# Patient Record
Sex: FEMALE | Race: WHITE
[De-identification: names, ages, dates, MRNs, and addresses within clinical notes are randomized per-mention and may not be internally consistent; named-entity substitution may affect disease eponyms.]

---

## 2017-03-27 ENCOUNTER — HOSPITAL ENCOUNTER (OUTPATIENT)
Dept: HOSPITAL 47 - MMGSC | Age: 57
End: 2017-03-27
Payer: COMMERCIAL

## 2017-03-27 DIAGNOSIS — R79.89: Primary | ICD-10-CM

## 2017-03-27 LAB
ALP SERPL-CCNC: 88 U/L (ref 38–126)
ALT SERPL-CCNC: 58 U/L (ref 9–52)
AST SERPL-CCNC: 39 U/L (ref 14–36)
BILIRUBIN DIRECT+TOT PNL SERPL-MCNC: 0.3 MG/DL (ref 0–0.2)
HEPATITIS B CORE IGM INDEX: 0.04
HEPATITIS B SURFACE AG INDEX: 0.07
HEPATITIS C VIRUS IGG  INDEX: 0.01
PROT SERPL-MCNC: 6.3 G/DL (ref 6.3–8.2)

## 2017-03-27 PROCEDURE — 80074 ACUTE HEPATITIS PANEL: CPT

## 2017-03-27 PROCEDURE — 36415 COLL VENOUS BLD VENIPUNCTURE: CPT

## 2017-03-27 PROCEDURE — 80076 HEPATIC FUNCTION PANEL: CPT

## 2017-08-12 ENCOUNTER — HOSPITAL ENCOUNTER (OUTPATIENT)
Dept: HOSPITAL 47 - LABWHC1 | Age: 57
Discharge: HOME | End: 2017-08-12
Payer: COMMERCIAL

## 2017-08-12 DIAGNOSIS — E78.00: Primary | ICD-10-CM

## 2017-08-12 LAB
CHOLEST SERPL-MCNC: 187 MG/DL (ref ?–200)
HDLC SERPL-MCNC: 61 MG/DL (ref 40–60)

## 2017-08-12 PROCEDURE — 80061 LIPID PANEL: CPT

## 2017-08-12 PROCEDURE — 36415 COLL VENOUS BLD VENIPUNCTURE: CPT

## 2017-08-14 ENCOUNTER — HOSPITAL ENCOUNTER (OUTPATIENT)
Dept: HOSPITAL 47 - RADMAMWWP | Age: 57
Discharge: HOME | End: 2017-08-14
Payer: COMMERCIAL

## 2017-08-14 ENCOUNTER — HOSPITAL ENCOUNTER (OUTPATIENT)
Dept: HOSPITAL 47 - ORWHC2ENDO | Age: 57
Discharge: HOME | End: 2017-08-14
Attending: SURGERY
Payer: COMMERCIAL

## 2017-08-14 VITALS — TEMPERATURE: 97.4 F

## 2017-08-14 VITALS — BODY MASS INDEX: 43.2 KG/M2

## 2017-08-14 VITALS — RESPIRATION RATE: 16 BRPM | DIASTOLIC BLOOD PRESSURE: 71 MMHG | SYSTOLIC BLOOD PRESSURE: 128 MMHG | HEART RATE: 69 BPM

## 2017-08-14 DIAGNOSIS — I10: ICD-10-CM

## 2017-08-14 DIAGNOSIS — Z86.73: ICD-10-CM

## 2017-08-14 DIAGNOSIS — Z88.5: ICD-10-CM

## 2017-08-14 DIAGNOSIS — E78.5: ICD-10-CM

## 2017-08-14 DIAGNOSIS — Z87.891: ICD-10-CM

## 2017-08-14 DIAGNOSIS — E11.9: ICD-10-CM

## 2017-08-14 DIAGNOSIS — Z79.899: ICD-10-CM

## 2017-08-14 DIAGNOSIS — K21.9: ICD-10-CM

## 2017-08-14 DIAGNOSIS — K57.30: ICD-10-CM

## 2017-08-14 DIAGNOSIS — Z12.11: Primary | ICD-10-CM

## 2017-08-14 DIAGNOSIS — Z12.31: Primary | ICD-10-CM

## 2017-08-14 DIAGNOSIS — Z86.010: ICD-10-CM

## 2017-08-14 LAB — GLUCOSE BLD-MCNC: 93 MG/DL (ref 75–99)

## 2017-08-14 PROCEDURE — 77063 BREAST TOMOSYNTHESIS BI: CPT

## 2017-08-14 PROCEDURE — 45378 DIAGNOSTIC COLONOSCOPY: CPT

## 2017-08-14 NOTE — P.GSHP
History of Present Illness


H&P Date: 17


Chief Complaint: Screening colonoscopy, history of colon polyps





Is a 56-year-old female who presents today for colonoscopy.  Her last 

colonoscopy was over 5 years ago.  She had benign colon polyps that time.





Past Medical History


Past Medical History: Cancer, Chest Pain / Angina, CVA/TIA, Diabetes Mellitus, 

GERD/Reflux, Hyperlipidemia, Hypertension, Osteoarthritis (OA)


Additional Past Medical History / Comment(s): migraines, TIA, hiatal hernia, 

diet control diabetic, skin cancer


History of Any Multi-Drug Resistant Organisms: None Reported


Past Surgical History: Cholecystectomy, Hysterectomy, Orthopedic Surgery, 

Tonsillectomy


Additional Past Surgical History / Comment(s): rt shoulder rotator cuff, left 

knee surgery x 7-some arthroscopic, 1/15/16 lap nissen fundoplasty


Past Anesthesia/Blood Transfusion Reactions: Family History of Problems w/ 

Anesthesia, Motion Sickness


Additional Past Anesthesia/Blood Transfusion Reaction / Comment(s): mom and 

sister PONV


Smoking Status: Former smoker





- Past Family History


  ** Mother


Family Medical History: No Reported History, Deep Vein Thrombosis (DVT), 

Hypertension





  ** Father


Additional Family Medical History / Comment(s): born with only one kidney.  he 

 in a mva when pt was very young





Medications and Allergies


 Home Medications











 Medication  Instructions  Recorded  Confirmed  Type


 


Citalopram Hydrobromide [CeleXA] 20 mg PO QAM 12/17/15 08/14/17 History


 


Ibuprofen [Motrin] 200 - 400 mg PO Q4-6H PRN 12/17/15 08/14/17 History


 


Lisinopril-Hctz 20-12.5 mg 2 tab PO QAM 12/17/15 08/14/17 History





[Zestoretic 20-12.5]    


 


Ranitidine HCl [Zantac] 150 mg PO BID 12/17/15 08/14/17 History


 


Simvastatin [Zocor] 40 mg PO QAM 12/17/15 08/14/17 History











 Allergies











Allergy/AdvReac Type Severity Reaction Status Date / Time


 


codeine Allergy  Nausea & Verified 08/10/17 14:26





   Vomiting  


 


hydrocodone bitartrate Allergy  Nausea & Verified 08/10/17 14:26





[From Vicodin]   Vomiting  


 


hydromorphone HCl Allergy  Nausea & Verified 08/10/17 14:26





[From Dilaudid]   Vomiting  


 


meperidine HCl [From Demerol] Allergy  Nausea & Verified 08/10/17 14:26





   Vomiting  


 


morphine Allergy  Nausea & Verified 08/10/17 14:26





   Vomiting  














Surgical - Exam


 Vital Signs











Temp Pulse Resp BP Pulse Ox


 


 97.4 F L  69   14   136/78   97 


 


 17 07:11  17 07:11  17 07:11  17 07:11  17 07:11














- General


well developed, no distress





- Eyes


PERRL





- ENT


normal pinna





- Neck


no masses





- Respiratory


normal expansion





- Cardiovascular


Rhythm: regular





- Abdomen


Abdomen: soft, non tender





Assessment and Plan


Plan: 





History: Positive.  We'll perform colonoscopy.

## 2017-08-14 NOTE — P.OP
Date of Procedure: 08/14/17


Preoperative Diagnosis: 


History of colon polyps


Postoperative Diagnosis: 


Diverticulosis


Procedure(s) Performed: 


Colonoscopy


Implants: 





Anesthesia: MAC


Surgeon: Marquise Suarez


Pathology: none sent


Condition: stable


Disposition: PACU


Indications for Procedure: 





Operative Findings: 





Description of Procedure: 


Patient's placed on the endoscopy table in the lateral position.  She received 

IV sedation.  Digital rectal exam was performed which revealed no 

abnormalities.  The flexible colonoscope was then placed patient anus passed 

throughout the entire colon.  The ileocecal valve was visualized.  The cecum, 

ascending and transverse colon appeared normal.  In the descending and sigmoid 

colon there was evidence of diverticular changes without diverticulitis.  The 

scope was then brought back the rectum and this appeared normal.  Scope was 

withdrawn for patient.

## 2017-08-24 NOTE — MM
Reason for exam: screening  (asymptomatic).

Last mammogram was performed 4 years and 8 months ago.



History:

Patient is postmenopausal and has history of other cancer at age 54.

Took estrogen for 2 years.



Physical Findings:

A clinical breast exam by your physician is recommended on an annual basis and 

results should be correlated with mammographic findings.



MG 3D Screening Mammo W/Cad

Bilateral CC and MLO view(s) were taken.

Prior study comparison: December 28, 2012, mammogram, performed at [facility].  October 11, 2011, mammogram, performed at [facility].

There are scattered fibroglandular densities.  No significant changes when 

compared with prior studies.





ASSESSMENT: Negative, BI-RAD 1



RECOMMENDATION:

Routine screening mammogram of both breasts in 1 year.

## 2017-10-02 ENCOUNTER — HOSPITAL ENCOUNTER (OUTPATIENT)
Dept: HOSPITAL 47 - EC | Age: 57
Setting detail: OBSERVATION
LOS: 2 days | Discharge: HOME | End: 2017-10-04
Attending: INTERNAL MEDICINE | Admitting: INTERNAL MEDICINE
Payer: COMMERCIAL

## 2017-10-02 DIAGNOSIS — H53.8: ICD-10-CM

## 2017-10-02 DIAGNOSIS — R00.1: ICD-10-CM

## 2017-10-02 DIAGNOSIS — M19.90: ICD-10-CM

## 2017-10-02 DIAGNOSIS — E11.9: ICD-10-CM

## 2017-10-02 DIAGNOSIS — F41.9: ICD-10-CM

## 2017-10-02 DIAGNOSIS — Z86.73: ICD-10-CM

## 2017-10-02 DIAGNOSIS — E78.5: ICD-10-CM

## 2017-10-02 DIAGNOSIS — I10: Primary | ICD-10-CM

## 2017-10-02 DIAGNOSIS — Z87.891: ICD-10-CM

## 2017-10-02 DIAGNOSIS — Z79.899: ICD-10-CM

## 2017-10-02 DIAGNOSIS — K44.9: ICD-10-CM

## 2017-10-02 DIAGNOSIS — Z88.5: ICD-10-CM

## 2017-10-02 DIAGNOSIS — Z82.49: ICD-10-CM

## 2017-10-02 DIAGNOSIS — K21.9: ICD-10-CM

## 2017-10-02 DIAGNOSIS — Z85.828: ICD-10-CM

## 2017-10-02 DIAGNOSIS — G43.909: ICD-10-CM

## 2017-10-02 LAB
ALP SERPL-CCNC: 72 U/L (ref 38–126)
ALT SERPL-CCNC: 31 U/L (ref 9–52)
ANION GAP SERPL CALC-SCNC: 9 MMOL/L
AST SERPL-CCNC: 21 U/L (ref 14–36)
BASOPHILS # BLD AUTO: 0.1 K/UL (ref 0–0.2)
BASOPHILS NFR BLD AUTO: 1 %
BUN SERPL-SCNC: 18 MG/DL (ref 7–17)
CALCIUM SPEC-MCNC: 9.2 MG/DL (ref 8.4–10.2)
CH: 29.9
CHCM: 32.2
CHLORIDE SERPL-SCNC: 106 MMOL/L (ref 98–107)
CK SERPL-CCNC: 26 U/L (ref 30–135)
CO2 SERPL-SCNC: 24 MMOL/L (ref 22–30)
EOSINOPHIL # BLD AUTO: 0.1 K/UL (ref 0–0.7)
EOSINOPHIL NFR BLD AUTO: 2 %
ERYTHROCYTE [DISTWIDTH] IN BLOOD BY AUTOMATED COUNT: 4.15 M/UL (ref 3.8–5.4)
ERYTHROCYTE [DISTWIDTH] IN BLOOD: 12.6 % (ref 11.5–15.5)
GLUCOSE SERPL-MCNC: 90 MG/DL (ref 74–99)
HCT VFR BLD AUTO: 38.8 % (ref 34–46)
HDW: 2.38
HGB BLD-MCNC: 12.9 GM/DL (ref 11.4–16)
LUC NFR BLD AUTO: 3 %
LYMPHOCYTES # SPEC AUTO: 3.3 K/UL (ref 1–4.8)
LYMPHOCYTES NFR SPEC AUTO: 44 %
MAGNESIUM SPEC-SCNC: 1.9 MG/DL (ref 1.6–2.3)
MCH RBC QN AUTO: 31 PG (ref 25–35)
MCHC RBC AUTO-ENTMCNC: 33.2 G/DL (ref 31–37)
MCV RBC AUTO: 93.5 FL (ref 80–100)
MONOCYTES # BLD AUTO: 0.4 K/UL (ref 0–1)
MONOCYTES NFR BLD AUTO: 5 %
NEUTROPHILS # BLD AUTO: 3.4 K/UL (ref 1.3–7.7)
NEUTROPHILS NFR BLD AUTO: 46 %
NON-AFRICAN AMERICAN GFR(MDRD): >60
POTASSIUM SERPL-SCNC: 4.2 MMOL/L (ref 3.5–5.1)
PROT SERPL-MCNC: 6.3 G/DL (ref 6.3–8.2)
SODIUM SERPL-SCNC: 139 MMOL/L (ref 137–145)
TROPONIN I SERPL-MCNC: <0.012 NG/ML (ref 0–0.03)
WBC # BLD AUTO: 0.2 10*3/UL
WBC # BLD AUTO: 7.4 K/UL (ref 3.8–10.6)
WBC (PEROX): 7.38

## 2017-10-02 PROCEDURE — 96374 THER/PROPH/DIAG INJ IV PUSH: CPT

## 2017-10-02 PROCEDURE — 71020: CPT

## 2017-10-02 PROCEDURE — 36415 COLL VENOUS BLD VENIPUNCTURE: CPT

## 2017-10-02 PROCEDURE — 96375 TX/PRO/DX INJ NEW DRUG ADDON: CPT

## 2017-10-02 PROCEDURE — 96361 HYDRATE IV INFUSION ADD-ON: CPT

## 2017-10-02 PROCEDURE — 82550 ASSAY OF CK (CPK): CPT

## 2017-10-02 PROCEDURE — 99285 EMERGENCY DEPT VISIT HI MDM: CPT

## 2017-10-02 PROCEDURE — 97161 PT EVAL LOW COMPLEX 20 MIN: CPT

## 2017-10-02 PROCEDURE — 84484 ASSAY OF TROPONIN QUANT: CPT

## 2017-10-02 PROCEDURE — 70553 MRI BRAIN STEM W/O & W/DYE: CPT

## 2017-10-02 PROCEDURE — 80053 COMPREHEN METABOLIC PANEL: CPT

## 2017-10-02 PROCEDURE — 85025 COMPLETE CBC W/AUTO DIFF WBC: CPT

## 2017-10-02 PROCEDURE — 93880 EXTRACRANIAL BILAT STUDY: CPT

## 2017-10-02 PROCEDURE — 83735 ASSAY OF MAGNESIUM: CPT

## 2017-10-02 PROCEDURE — 70450 CT HEAD/BRAIN W/O DYE: CPT

## 2017-10-02 PROCEDURE — 93005 ELECTROCARDIOGRAM TRACING: CPT

## 2017-10-02 PROCEDURE — 96372 THER/PROPH/DIAG INJ SC/IM: CPT

## 2017-10-02 PROCEDURE — 93306 TTE W/DOPPLER COMPLETE: CPT

## 2017-10-02 PROCEDURE — 96376 TX/PRO/DX INJ SAME DRUG ADON: CPT

## 2017-10-02 PROCEDURE — 82553 CREATINE MB FRACTION: CPT

## 2017-10-02 RX ADMIN — MECLIZINE PRN MG: 12.5 TABLET ORAL at 23:21

## 2017-10-02 RX ADMIN — IBUPROFEN PRN MG: 800 TABLET, FILM COATED ORAL at 23:21

## 2017-10-02 NOTE — P.HPIM
History of Present Illness


H&P Date: 10/02/17


Chief Complaint: Blurry vision acute onset


 This is a very pleasant 56-year-old female with a past medical history 

significant for hypertension who presents to our facility with complaints of 

acute onset of blurry vision.  Patient states that last evening she was in her 

usual state of health had no significant symptoms or issues when she went to 

bed.  When she woke up this morning she had an acute onset of blurry vision 

with dizziness.  She also felt mildly nauseated but despite this decided to get 

ready for work.  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  She started to experience a 

significant headache as well as nausea with dry heaves and ongoing blurry 

vision.  It got to a point where she was unable to move due to the significant 

visual disturbance.  She contacted her  who picked her up and brought 

her to the emergency room for further evaluation.  In the emergency room she 

had a computed tomography scan of the brain that was negative for any acute 

intracranial abnormalities.  The patient was given some IV fluid hydration and 

addition to some meclizine but despite this had ongoing symptoms.  


  On my assessment the patient states that she continues to have symptoms and 

has really not been able to get up from the bed due to concern of fall.  She 

has no history of stroke but states that she was told in the past that she had 

a mini stroke.  Does not recall having any neurologic symptoms in the past.  

Does have a history of migraine cephalgia however these were mostly when she 

was a young lady her last migraine was when she was in high school.  She does 

not take a baby aspirin.  She used to be on a statin however this medication 

was discontinued as she's had significant weight loss as an outpatient with 

normalization of her lipid panel per the patient.  In fact she states that her 

blood pressure medicines have also been decreased in dose due to her weight 

loss.  She denies any numbness tingling, no associated weakness in her 

extremities no slurring of speech no facial asymmetry.  She has had no recent 

fevers chills, rigors, cough.








Review of Systems


Constitutional:  Patient reports no fever, no chills, she has had intentional 

weight loss, with diet and exercise


Eyes: Patient reports acute blurry vision, no loss of vision, associated with a 

left-sided occipital headache


ENT:  Patient reports no rhinorrhea,  no post nasal drip, no sore throat


Cardiovascular: Patient reports no chest, no edema, no palpitations, no syncope

, no orthopnea, no paroxysmal nocturnal dyspnea.


Respiratory:  Patient reports no dyspnea, no cough, no wheeze 


Gastrointestinal:  Patient reports nausea with dry heaves, but no abdominal 

pain no diarrhea melena or hematochezia


Genitourinary:   Patient reports no dysuria, no urinary frequency, no hematuria.


Musculoskeletal:  Patient reports no unusual joint pain, no joint swelling or 

weakness.  Patient reports no muscular pain.


Psychiatric:  Patient reports no changes in mood, no sleeping problems.   

Patient reports no changes in memory.


Endocrine:  Patient reports no thirst, no polyuria, no cold intolerance, no 

heat intolerance.


Neurological:  Patient reports no unusual paresthesias, no seizures, no paresis

, no paralysis, no facila droop.  She does complain of blurry vision and a left 

occipital


Heme/Lymphatic:   Patient reports no easy bruising, no bleeding tendency, no 

lymphadenopathy.


Allergic/ Immunologic:  Patient reports no recent allergic reactions or 

immunologic history.


Skin:  Patient reports no rashes or unusual lesions.











Past Medical History


Past Medical History: Cancer, Chest Pain / Angina, CVA/TIA, Diabetes Mellitus, 

GERD/Reflux, Hyperlipidemia, Hypertension, Osteoarthritis (OA)


Additional Past Medical History / Comment(s): migraines, TIA, hiatal hernia, 

diet control diabetic, skin cancer, "I'M BEING WORKED UP FOR POSS RA", 16 

DOG BITE RT INDEX FINGER.


History of Any Multi-Drug Resistant Organisms: None Reported


Past Surgical History: Cholecystectomy, Heart Catheterization, Hysterectomy, 

Orthopedic Surgery, Tonsillectomy


Additional Past Surgical History / Comment(s): rt shoulder rotator cuff, left 

knee surgery x 7-some arthroscopic, 1/15/16 lap nissen fundoplasty LT WRIST 

GANGLION CYST, LT CARPAL TUNNEL


Past Anesthesia/Blood Transfusion Reactions: Family History of Problems w/ 

Anesthesia, Motion Sickness


Additional Past Anesthesia/Blood Transfusion Reaction / Comment(s): mom and 

sister PONV


Smoking Status: Former smoker





- Past Family History


  ** Mother


Family Medical History: Deep Vein Thrombosis (DVT), Hypertension, 

Osteoarthritis (OA)





  ** Father


Additional Family Medical History / Comment(s): dad  in mva in . only 

hx known is he was born with only one kidney.





Medications and Allergies


 Home Medications











 Medication  Instructions  Recorded  Confirmed  Type


 


Citalopram Hydrobromide [CeleXA] 20 mg PO QAM 12/17/15 10/02/17 History


 


Lisinopril-Hctz 20-12.5 mg 1 tab PO QAM 12/17/15 10/02/17 History





[Zestoretic 20-12.5]    


 


Ibuprofen [Motrin] 800 mg PO Q6H PRN 10/02/17 10/02/17 History











 Allergies











Allergy/AdvReac Type Severity Reaction Status Date / Time


 


codeine Allergy  Nausea & Verified 10/02/17 11:04





   Vomiting  


 


hydrocodone bitartrate Allergy  Nausea & Verified 10/02/17 11:04





[From Vicodin]   Vomiting  


 


hydromorphone HCl Allergy  Nausea & Verified 10/02/17 11:04





[From Dilaudid]   Vomiting  


 


meperidine HCl [From Demerol] Allergy  Nausea & Verified 10/02/17 11:04





   Vomiting  


 


morphine Allergy  Nausea & Verified 10/02/17 11:04





   Vomiting  














Physical Exam


Vitals: 


 Vital Signs











  Temp Pulse Pulse Resp BP BP Pulse Ox


 


 10/02/17 14:37    55 L    


 


 10/02/17 13:59  97.7 F   60  18   152/72  100


 


 10/02/17 13:29  98 F  58 L   16  141/66   100


 


 10/02/17 10:53  97.6 F  52 L   18  146/72   99








 Intake and Output











 10/02/17 10/02/17 10/02/17





 06:59 14:59 22:59


 


Other:   


 


  Voiding Method  Toilet 


 


  Weight  117.3 kg 








 Patient Weight











 10/03/17





 06:59


 


Weight 117.3 kg











Constitutional:       No acute distress, conversant, pleasant


Eyes:      Anicteric sclerae, moist conjunctiva, no lid-lag


         PERRLA


         ENMT: NC/AT Oropharynx clear, no erythema, exudates


Neck:      Supple, FROM, no masses, or JVD


         No carotid bruits


         No thyromegaly


Lungs:        Clear to auscultation


         Clear to percussion


         Normal respiratory effort, no accessory muscle use 


Cardiovascular:   Heart regular in rate and rhythm, 


         No murmurs, gallops, or rubs


         No peripheral edema


Abdominal:       Soft


         Nontender, no guarding, rebound or rigidity


         Abdomen moving with respiration


         Normoactive bowel sounds


         No hepatomegaly, No splenomegaly


         No palpable mass 


         No abdominal wall hernia noted 


Skin:       Normal temperature, tone, texture, turgor


         No induration





         No rash, lesions





Extremities:      No digital cyanosis 


         No clubbing


         Pedal pulses intact and symmetrical


         Radial pulses intact and symmetrical 


         Normal gait and station


         No calf tenderness 


Psychiatric:      Alert and oriented to person, place and time


         Appropriate affect


         Intact judgement         


Neuro:      Muscles Strength 5/5 in all 4 extremities


         Sensation to light touch grossly present throughout


         Cranial nerves II-XII grossly intact


         No focal sensory deficits








Results


CBC & Chem 7: 


 10/02/17 11:34





 10/02/17 11:34


Labs: 


 Abnormal Lab Results - Last 24 Hours (Table)











  10/02/17 10/02/17 Range/Units





  11:34 11:34 


 


BUN   18 H  (7-17)  mg/dL


 


Total Creatine Kinase  26 L   ()  U/L














Assessment and Plan


(1) TIA (transient ischemic attack)


Status: Acute   





(2) HTN (hypertension)


Status: Acute   





(3) Blurred vision, bilateral


Status: Acute   





(4) Bradycardia


Status: Acute   


Plan: 


1.  Acute onset of blurry vision associated with occipital headache and 

imbalance and patient with moderate risk for CNS abnormality:


* Patient at this time is being placed in observation status, will undergo a 

stroke workup including an MRI of the brain, echocardiogram, and ultrasound of 

the carotids


* Patient will be started on a baby aspirin, she is aspirin davie.  Fasting 

lipid panel will be ordered, and statin therapy will be resumed if there is any 

evidence of CVA


* Should her workup be negative, will continue symptomatic treatment, we'll 

consider Apley's maneuver as she may have BPPV 


2.  Essential hypertension:


* Monitor blood pressure, allow for permissive higher pressures in the setting 

of possible acute CNS event.  Home blood pressure medications will remain on 

hold and we'll monitor closely.


3.  Nausea with dry heaves


* As needed anti-emetics have been ordered


4.  Acute headache:


* As needed analgesia orally has been ordered


5.  DVT prophylaxis as per protocol


6.  Full code 


    Total time spent in the initial evaluation and coordination of care of this 

patient was greater than 65 minutes.

## 2017-10-02 NOTE — XR
EXAMINATION TYPE: XR chest 2V

 

DATE OF EXAM: 10/2/2017

 

COMPARISON: NONE

 

TECHNIQUE: PA and lateral views submitted.

 

HISTORY: Chest pain

 

FINDINGS:

The lungs are clear and  there is no pneumothorax, pleural effusion, or focal pneumonia.  Nodule left
 upper lobe may represent small granuloma. Hypertrophic and degenerative change of the spine. Mediast
inum is somewhat prominent.

 

IMPRESSION: 

1. Mediastinum is somewhat prominent consider follow-up CT scan chest..

## 2017-10-02 NOTE — CT
EXAMINATION TYPE: CT brain wo con

 

DATE OF EXAM: 10/2/2017

 

COMPARISON: NONE

 

HISTORY: Dizziness and blurred vision

 

CT DLP: 1109 mGycm

 

Unenhanced CT of the brain was performed.  

 

The ventricles, basal cisterns and sulci overlying the cerebral convexities demonstrate mild enlargem
ent. 

 

There is no evidence for intracranial hemorrhage or sulcal effacement.  

 

There is decreased attenuation about the periventricular white matter and deep white matter of both c
erebral hemispheres, compatible with chronic small vessel ischemia. Differential diagnosis does inclu
de demyelination. 

 

No mass effects are seen.No midline shift.

 

Osseous calvarium is intact.  

 

If symptoms persist consider MRI.  

 

IMPRESSION:

 

1. Age related atrophic and chronic small vessel ischemic change without acute intracranial process s
een at this time.

## 2017-10-02 NOTE — US
EXAMINATION TYPE: US carotid duplex BILAT

 

DATE OF EXAM: 10/2/2017

 

COMPARISON: NONE

 

CLINICAL HISTORY: CVA work up.

 

EXAM MEASUREMENTS: 

 

RIGHT:  Peak Systolic Velocity (PSV) cm/sec

----- Right CCA:  87.1  

----- Right ICA:  75.4     

----- Right ECA:  61.2   

ICA/CCA ratio:  0.9    

 

RIGHT:  End Diastole cm/sec

----- Right CCA:  12.1   

----- Right ICA:  23.0      

----- Right ECA:  8.7

 

LEFT:  Peak Systolic Velocity (PSV) cm/sec

----- Left CCA:  86.3  

----- Left ICA:  123.6   

----- Left ECA:  52.7  

ICA/CCA ratio:  1.4  

 

LEFT:  End Diastole cm/sec

----- Left CCA:  19.9  

----- Left ICA:  40.8   

----- Left ECA:  8.2

 

VERTEBRALS (direction of flow):

Right Vertebral: Antegrade

Left Vertebral: Antegrade

 

 

 

Patient of large body habitus with short thick neck making exam technically difficult. No significant
 velocity elevations.

 

Exam suboptimal per technologist as detailed above. Grayscale images of the carotid bulbs show no sig
nificant focal plaque bilaterally. Velocity measurements and ratios in visualized portion of both int
ernal carotid arteries is within normal limits. 

 

IMPRESSION:  Suboptimal study without hemodynamically significant stenosis clearly seen in either int
ernal carotid artery

## 2017-10-02 NOTE — MR
EXAMINATION TYPE: MR brain wo/w con

 

DATE OF EXAM: 10/2/2017

 

COMPARISON: NONE

 

HISTORY: Dizziness, Blurry Vision, CVA

 

TECHNIQUE: 

Multiplanar, multisequence images of the brain and brainstem is performed without and with IV contras
t, utilizing 12 mL intravenous Gadavist .

 

FINDINGS: Diffusion weighted images demonstrate no evidence of a recent infarct or other diffusion ab
normality. Changes of chronic sinusitis.

 

There are a few scattered focal areas within the white matter which are nonspecific noted bilaterally
. No lesions perpendicular to ventricular system. No callosal lesions.

 

Vague enhancement along the inferior frontal lobe on the right. However there severe artifact.

 

Midline structures demonstrate normal morphology.  The craniocervical junction appears within normal 
limits.  Post contrast images demonstrate no abnormal enhancement. The dural venous sinuses appear pa
tent.

 

IMPRESSION: 

1. Nonspecific scattered areas of abnormal signal the white matter can be seen with migraine headache
s, remote microvascular ischemia, hypertension, or demyelinating disease.

2. There is vague enhancement along the inferior frontal lobe on the right. This may be artifactual a
s the exam is limited by significant motion artifact. Could not exclude pathologic enhancement in the
 region. Recommend a short-term follow-up MRI for assessment.

## 2017-10-02 NOTE — ED
General Adult HPI





- General


Chief complaint: Dizziness


Stated complaint: Dizziness


Time Seen by Provider: 10/02/17 11:00


Source: patient, RN notes reviewed


Mode of arrival: wheelchair


Limitations: no limitations





- History of Present Illness


Initial comments: 





This is a 56-year-old female presents emergency Department complaining of 

dizziness today.  Patient states if she sits still is much improved however she 

starts to move her head or walking gets much worse.  Patient states she's 

nauseated but has not vomited.  Patient states there is some mild posterior 

head pain since she's had these symptoms.  Patient denies any ringing in the 

ears patient denies any hearing loss.  Patient states she does have some visual 

disturbance.  Patient states his blurred vision and it occurs in both eyes and 

she told the nurses she had flowed easily but she told me is not really flowed 

ease it's more some sparkling sensation when she is looking at things and it 

occurs in both eyes.  Patient denies any chest pain palpitations difficulty 

breathing shortness of breath.  Patient denies any similar symptoms.  Patient 

states about a week ago she had upper respiratory symptoms.  Patient denies any 

abdominal pain patient denies any back pain patient denies any recent fever 

chills or cough





- Related Data


 Home Medications











 Medication  Instructions  Recorded  Confirmed


 


Citalopram Hydrobromide [CeleXA] 20 mg PO QAM 12/17/15 10/02/17


 


Lisinopril-Hctz 20-12.5 mg 1 tab PO QAM 12/17/15 10/02/17





[Zestoretic 20-12.5]   


 


Ibuprofen [Motrin] 800 mg PO Q6H PRN 10/02/17 10/02/17











 Allergies











Allergy/AdvReac Type Severity Reaction Status Date / Time


 


codeine Allergy  Nausea & Verified 10/02/17 11:04





   Vomiting  


 


hydrocodone bitartrate Allergy  Nausea & Verified 10/02/17 11:04





[From Vicodin]   Vomiting  


 


hydromorphone HCl Allergy  Nausea & Verified 10/02/17 11:04





[From Dilaudid]   Vomiting  


 


meperidine HCl [From Demerol] Allergy  Nausea & Verified 10/02/17 11:04





   Vomiting  


 


morphine Allergy  Nausea & Verified 10/02/17 11:04





   Vomiting  














Review of Systems


ROS Statement: 


Those systems with pertinent positive or pertinent negative responses have been 

documented in the HPI.





ROS Other: All systems not noted in ROS Statement are negative.





Past Medical History


Past Medical History: Cancer, Chest Pain / Angina, CVA/TIA, Diabetes Mellitus, 

GERD/Reflux, Hyperlipidemia, Hypertension, Osteoarthritis (OA)


Additional Past Medical History / Comment(s): migraines, TIA, hiatal hernia, 

diet control diabetic, skin cancer


History of Any Multi-Drug Resistant Organisms: None Reported


Past Surgical History: Cholecystectomy, Hysterectomy, Orthopedic Surgery, 

Tonsillectomy


Additional Past Surgical History / Comment(s): rt shoulder rotator cuff, left 

knee surgery x 7-some arthroscopic, 1/15/16 lap nissen fundoplasty


Past Anesthesia/Blood Transfusion Reactions: Family History of Problems w/ 

Anesthesia, Motion Sickness


Additional Past Anesthesia/Blood Transfusion Reaction / Comment(s): mom and 

sister PONV


Past Psychological History: Anxiety


Smoking Status: Former smoker


Past Alcohol Use History: Occasional


Past Drug Use History: None Reported





- Past Family History


  ** Mother


Family Medical History: No Reported History, Deep Vein Thrombosis (DVT), 

Hypertension





  ** Father


Additional Family Medical History / Comment(s): born with only one kidney.  he 

 in a mva when pt was very young





General Exam





- General Exam Comments


Initial Comments: 





GENERAL:


Patient is well-developed and well-nourished.  Patient is nontoxic and well-

hydrated and is in mild distress.





ENT:


Neck is soft and supple.  No significant lymphadenopathy is noted.  Oropharynx 

is clear.  Moist mucous membranes.  Neck has full range of motion without 

eliciting any pain. 





EYES:


The sclera were anicteric and conjunctiva were pink and moist.  Patient has 

nystagmus when looking to the right.  Extraocular movements were intact and 

pupils were equal round and reactive to light.  Eyelids were unremarkable.





PULMONARY:


Unlabored respirations.  Good breath sounds bilaterally.  No audible rales 

rhonchi or wheezing was noted.





CARDIOVASCULAR:


There is a regular rate and rhythm without any murmurs gallops or rubs. 





ABDOMEN:


Soft and nontender with normal bowel sounds.  No palpable organomegaly was 

noted.  There is no palpable pulsatile mass.





SKIN:


Skin is clear with no lesions or rashes and otherwise unremarkable.





NEUROLOGIC:


Patient is alert and oriented x3.  Cranial nerves II through XII are grossly 

intact.  Motor and sensory are also intact.  Normal speech, volume and content.

  Symmetrical smile.  Finger to nose testing was normal bilaterally





MUSCULOSKELETAL:


Normal extremities with adequate strength and full range of motion.  





LYMPHATICS:


No significant lymphadenopathy is noted





PSYCHIATRIC:


Normal psychiatric evaluation.  Normal interpersonal interactions appears 

functionally intact in deals appropriately with others.  No signs of 

depression.  No signs of anxiety.  


Limitations: no limitations





Course


 Vital Signs











  10/02/17





  10:53


 


Temperature 97.6 F


 


Pulse Rate 52 L


 


Respiratory 18





Rate 


 


Blood Pressure 146/72


 


O2 Sat by Pulse 99





Oximetry 














Medical Decision Making





- Medical Decision Making





EKG shows sinus bradycardia at 46 bpm ID interval is 154 QRS is 104 QT interval 

is 462 QTC is 404.  Patient's EKG is compared to an old EKG and there are no 

acute changes noted except for the bradycardia.





Computed tomography scan shows no acute abnormality.





Patient's complaint of 





- Lab Data


Result diagrams: 


 10/02/17 11:34





 10/02/17 11:34


 Lab Results











  10/02/17 10/02/17 10/02/17 Range/Units





  11:34 11:34 11:34 


 


WBC   7.4   (3.8-10.6)  k/uL


 


RBC   4.15   (3.80-5.40)  m/uL


 


Hgb   12.9   (11.4-16.0)  gm/dL


 


Hct   38.8   (34.0-46.0)  %


 


MCV   93.5   (80.0-100.0)  fL


 


MCH   31.0   (25.0-35.0)  pg


 


MCHC   33.2   (31.0-37.0)  g/dL


 


RDW   12.6   (11.5-15.5)  %


 


Plt Count   260   (150-450)  k/uL


 


Neutrophils %   46   %


 


Lymphocytes %   44   %


 


Monocytes %   5   %


 


Eosinophils %   2   %


 


Basophils %   1   %


 


Neutrophils #   3.4   (1.3-7.7)  k/uL


 


Lymphocytes #   3.3   (1.0-4.8)  k/uL


 


Monocytes #   0.4   (0-1.0)  k/uL


 


Eosinophils #   0.1   (0-0.7)  k/uL


 


Basophils #   0.1   (0-0.2)  k/uL


 


Sodium    139  (137-145)  mmol/L


 


Potassium    4.2  (3.5-5.1)  mmol/L


 


Chloride    106  ()  mmol/L


 


Carbon Dioxide    24  (22-30)  mmol/L


 


Anion Gap    9  mmol/L


 


BUN    18 H  (7-17)  mg/dL


 


Creatinine    0.68  (0.52-1.04)  mg/dL


 


Est GFR (MDRD) Af Amer    >60  (>60 ml/min/1.73 sqM)  


 


Est GFR (MDRD) Non-Af    >60  (>60 ml/min/1.73 sqM)  


 


Glucose    90  (74-99)  mg/dL


 


Calcium    9.2  (8.4-10.2)  mg/dL


 


Magnesium    1.9  (1.6-2.3)  mg/dL


 


Total Bilirubin    0.3  (0.2-1.3)  mg/dL


 


AST    21  (14-36)  U/L


 


ALT    31  (9-52)  U/L


 


Alkaline Phosphatase    72  ()  U/L


 


Total Creatine Kinase  26 L    ()  U/L


 


CK-MB (CK-2)  1.0    (0.0-2.4)  ng/mL


 


CK-MB (CK-2) Rel Index  3.8    


 


Troponin I  <0.012    (0.000-0.034)  ng/mL


 


Total Protein    6.3  (6.3-8.2)  g/dL


 


Albumin    3.6  (3.5-5.0)  g/dL














Disposition


Clinical Impression: 


 Bradycardia, Dizziness, Blurred vision, bilateral





Disposition: ADMITTED AS IP TO THIS HOSP


Referrals: 


Frida Crowell MD [Primary Care Provider] - 1-2 days


Time of Disposition: 13:08

## 2017-10-03 RX ADMIN — KETOROLAC TROMETHAMINE SCH MG: 30 INJECTION, SOLUTION INTRAMUSCULAR at 17:27

## 2017-10-03 RX ADMIN — ONDANSETRON HYDROCHLORIDE PRN MG: 4 TABLET, FILM COATED ORAL at 13:16

## 2017-10-03 RX ADMIN — CITALOPRAM HYDROBROMIDE SCH MG: 20 TABLET ORAL at 08:59

## 2017-10-03 RX ADMIN — PROMETHAZINE HYDROCHLORIDE SCH MG: 25 INJECTION INTRAMUSCULAR; INTRAVENOUS at 23:42

## 2017-10-03 RX ADMIN — IBUPROFEN PRN MG: 800 TABLET, FILM COATED ORAL at 12:28

## 2017-10-03 RX ADMIN — PROMETHAZINE HYDROCHLORIDE SCH MG: 25 INJECTION INTRAMUSCULAR; INTRAVENOUS at 17:28

## 2017-10-03 RX ADMIN — ONDANSETRON HYDROCHLORIDE PRN MG: 4 TABLET, FILM COATED ORAL at 04:54

## 2017-10-03 RX ADMIN — KETOROLAC TROMETHAMINE SCH MG: 30 INJECTION, SOLUTION INTRAMUSCULAR at 23:41

## 2017-10-03 NOTE — P.PN
Progress Note - Text


Patient was seen and re-evaluated.  at the bedside. States Toradol with 

phenergan helped but now symptoms coming back again. Still having difficulty 

with ambulation due to HA and dizziness. Echocardiogram results were reviewed, 

preserved EF, mild LVH.





Plan:


1. Schedule Toradol + Phnergan q 6 hours x 3 doses


2. PT


3. Reassess in am, still not safe for discharge to home, increased fall risk.

## 2017-10-03 NOTE — P.PN
Subjective


Principal diagnosis: 


1. TIA/CVA  versus BPPV


 2. Migraine cephalgia


 3. Bradycardia asymptomatic


 4. HTN





 Patient was seen and evaluated. She continues to have severe dizziness, denies 

vertigo more like "head just spinning". Continues to have severe headache, no 

photophobia, no loss of vision, no slurring of speech. Headache is a little 

more intense today, not worse headache of her life, more of ache, nagging. 

Patient denies any associated nausea, emesis, no diarrhea, no abdominal pain, 

no fever, chills, or rigors, no cough.








Objective





- Vital Signs


Vital signs: 


 Vital Signs











Temp  97.1 F L  10/03/17 04:00


 


Pulse  56 L  10/03/17 04:00


 


Resp  18   10/03/17 04:00


 


BP  119/63   10/03/17 04:00


 


Pulse Ox  96   10/03/17 04:00








 Intake & Output











 10/02/17 10/03/17 10/03/17





 18:59 06:59 18:59


 


Intake Total  600 


 


Balance  600 


 


Weight 117.3 kg  


 


Intake:   


 


  Intake, IV Titration  600 





  Amount   


 


    Sodium Chloride 0.9% 1,  600 





    000 ml @ 75 mls/hr IV .   





    [id number] Rx#:[id number]   


 


Other:   


 


  Voiding Method Toilet Toilet 


 


  # Voids  1 














- Exam


Constitutional:       No acute distress, conversant, pleasant


Head/throat:      Anicteric sclerae, moist conjunctiva, no lid-lag


         PERRLA


         NC/AT Oropharynx clear, no erythema, exudates


Neck:      Supple, FROM, no masses, or JVD


         No carotid bruits


         No thyromegaly


Lungs:        Clear to auscultation


         Clear to percussion


         Normal respiratory effort, no accessory muscle use 


Cardiovascular:   Heart regular in rate and rhythm, 


         No murmurs, gallops, or rubs


         No peripheral edema


Abdominal:       Soft


         Nontender, no guarding, rebound or rigidity


         Abdomen moving with respiration


         Normoactive bowel sounds


         No hepatomegaly, No splenomegaly


         No palpable mass 


         


Skin:       Normal temperature, tone, texture, turgor


         


Extremities:      No digital cyanosis 


         No clubbing


         Pedal pulses intact and symmetrical


         Radial pulses intact and symmetrical 


         


         


Psychiatric:      Alert and oriented to person, place and time


         Appropriate affect


         Intact judgement         


Neuro:      Muscles Strength 5/5 in all 4 extremities


         Sensation to light touch grossly present throughout


         Cranial nerves II-XII grossly intact


         No focal sensory deficits








- Labs


CBC & Chem 7: 


 10/02/17 11:34





 10/02/17 11:34


Labs: 


 Abnormal Lab Results - Last 24 Hours (Table)











  10/02/17 10/02/17 Range/Units





  11:34 11:34 


 


BUN   18 H  (7-17)  mg/dL


 


Total Creatine Kinase  26 L   ()  U/L














Assessment and Plan


(1) TIA (transient ischemic attack)


Status: Acute   





(2) HTN (hypertension)


Status: Acute   





(3) Blurred vision, bilateral


Status: Acute   





(4) Bradycardia


Status: Acute   


Plan: 


1. Persistent dizziness with headache, MRI not supportive of CVA, shows changes 

consistent with migraine


*  Migraine cocktail ordered. 


* Discussed with PT they will attempt Alphonse pike maneuver.


* PT/OT to assess for safety on discharge


* Okay to transfer her to medical floor with telemetry once she has been 

monitored 24 hours as per CVA/TIA protocol


* MRI results reviewed with patient, she will need a follow up MRI


* US Carotids no significant findings also reviewed


* Echocardiogram results pending


 2. Migraine cephalgia, acute exacerbation


* Toradol, Phenergan, IV 


* NS at 200 cc/hr x 1 liter


 3. Asymptomatic Bradycardia


* monitor, asymptomatic, increases rate with activity


* avoid use of rate controlling agents


 4. Disposition:


* PT to try Alphonse-pike for BPPV


* Migraine treatment


* Follow up echocardiogram


* Reassess this afternoon


Time spent thus far in  and coordination of care greater than 30 minutes

## 2017-10-03 NOTE — ECHOF
Referral Reason:CVA work up



MEASUREMENTS

--------

HEIGHT: 160.0 cm

WEIGHT: 117.9 kg

BP: 152/72

IVSd:   1.3 cm     (0.6 - 1.1)

LVIDd:   4.5 cm     (3.9 - 5.3)

LVPWd:   0.9 cm     (0.6 - 1.1)

IVSs:   1.4 cm

LVIDs:   3.3 cm

LVPWs:   1.3 cm

LAESV Index (A-L):   32.65 ml/m

Ao Diam:   2.9 cm     (2.0 - 3.7)

AV Cusp:   1.8 cm     (1.5 - 2.6)

LA Diam:   3.3 cm     (2.7 - 3.8)

MV EXCURSION:   24.295 mm     (> 18.000)

MV EF SLOPE:   110 mm/s     (70 - 150)

EPSS:   1.2 cm

MV E Bennett:   0.84 m/s

MV DecT:   183 ms

MV A Bennett:   0.78 m/s

MV E/A Ratio:   1.07 

RAP:   5.00 mmHg

RVSP:   16.46 mmHg







FINDINGS

--------

Sinus rhythm.

This was a technically adequate study.

The left ventricular size is normal.   There is mild 

concentric left ventricular hypertrophy.   Overall left 

ventricular systolic function is normal with, an EF 

between 55 - 60 %.

The right ventricle is normal in size.

LA is midly dilated 29-33ml/m2.

The right atrial size is normal.

The aortic valve is trileaflet, and appears 

structurally normal. No aortic stenosis or 

regurgitation.

Mild mitral regurgitation is present.

Mild tricuspid regurgitation present.   There is no 

evidence of pulmonary hypertension.   The right 

ventricular systolic pressure, as measured by Doppler, 

is 16.46mmHg.

Trace/mild (physiologic)  pulmonic regurgitation.

The aortic root size is normal.

There is no pericardial effusion.



CONCLUSIONS

--------

1. The left ventricular size is normal.

2. Trace/mild (physiologic)  pulmonic regurgitation.

3. The aortic root size is normal.

4. There is no pericardial effusion.

5. There is mild concentric left ventricular hypertrophy.

6. Overall left ventricular systolic function is normal 

with, an EF between 55 - 60 %.

7. LA is midly dilated 29-33ml/m2.

8. The aortic valve is trileaflet, and appears 

structurally normal. No aortic stenosis or 

regurgitation.

9. Mild mitral regurgitation is present.

10. Mild tricuspid regurgitation present.

11. There is no evidence of pulmonary hypertension.

12. The right ventricular systolic pressure, as measured by 

Doppler, is 16.46mmHg.





SONOGRAPHER: Lacie Alvarado RDCS

## 2017-10-04 VITALS — DIASTOLIC BLOOD PRESSURE: 64 MMHG | TEMPERATURE: 97.3 F | SYSTOLIC BLOOD PRESSURE: 128 MMHG | HEART RATE: 58 BPM

## 2017-10-04 VITALS — RESPIRATION RATE: 18 BRPM

## 2017-10-04 RX ADMIN — MECLIZINE PRN MG: 12.5 TABLET ORAL at 09:02

## 2017-10-04 RX ADMIN — CITALOPRAM HYDROBROMIDE SCH MG: 20 TABLET ORAL at 08:17

## 2017-10-04 RX ADMIN — KETOROLAC TROMETHAMINE SCH MG: 30 INJECTION, SOLUTION INTRAMUSCULAR at 05:50

## 2017-10-04 RX ADMIN — PROMETHAZINE HYDROCHLORIDE SCH: 25 INJECTION INTRAMUSCULAR; INTRAVENOUS at 05:50

## 2017-10-04 NOTE — P.DS
Providers


Date of admission: 


10/02/17 13:09





Expected date of discharge: 10/04/17


Attending physician: 


Mary Lou Foster DO





Primary care physician: 


Frida Crowell








- Discharge Diagnosis(es)


(1) HTN (hypertension)


Current Visit: Yes   Status: Acute   





(2) Blurred vision, bilateral


Current Visit: Yes   Status: Acute   





(3) Bradycardia


Current Visit: Yes   Status: Acute   


Hospital Course: 


 This is a very pleasant 56-year-old female with a past medical history 

significant for essential hypertension who was admitted to our facility on 10-

2017 with:


1.  Acute onset of dizziness associated with headache and blurry vision 

presumed every migraine cephalgia: Sclerae pleasant patient presented to our 

facility when acute onset of dizziness that was associated with blurry vision 

and headache.  The patient woke up on the morning of admission with B symptoms 

had receded to go to work however had progressive worsening of symptoms 

including associated dry heaves and nausea.  On initial assessment in the 

emergency room she had a computed tomography scan of the brain without contrast 

that was negative for any acute intracranial abnormalities.  He had ongoing 

symptoms persisted despite IV fluids and Antivert, sound hospitalist service 

was asked to evaluate the patient for observation/admission.  The patient had 

no associated palpitations she had no chest pain had noticed no slurring in her 

speech no weakness numbness or tingling.  She had reported a remote history of 

migraines when she was in high school otherwise no issues with headaches in the 

past.  Not described this as the worst headache of her life.  She was placed in 

observation under telemetry unit for close frequent monitoring including neuro 

checks and workup for possible TIA versus CVA.  She had as part of her workup 

and MRI of the brain showed scattered signaling consistent with migraine, there 

was a right frontal lobe enhancement of unclear etiology what recommendation 

that she have a follow-up MRI in the outpatient setting.  Ultrasound of her 

carotids were negative for any significant obstruction.  Echocardiogram: 

Preserved ejection fraction with mild left ventricular hypertrophy no 

significant valvular heart disease.


   The patient was seen and evaluated by physical therapy for possible benign 

positional vertigo however maneuvers did not appear to improve her symptoms.  

She was treated aggressively for acute migraine cephalgia with Toradol, and 

Phenergan and this appeared to resolve her headache.  Her dizziness however 

persisted with no no relief despite trying the Antivert.  Overall she states 

that while her dizziness persists A has significantly improved but she is not 

at her baseline.  She has been able to ambulate with physical therapy using a 

cane and has been safe.   is comfortable taking her home so that she can 

recover in the comfort of her home setting.  They live on one floor and have a 

fairly non-obstructive living situation with minimal furniture.  They both feel 

confident that she can manage at home with her 's assistance.


   Her antihypertensive regimen was stopped due to normal blood pressure off 

medication and to avoid dehydration with diuretic. Patient instructed to follow 

up with PCP for re-evaluation as outpatient. Given weight loss may not need any 

antihypertensive.





2.  Right frontal abnormality on MRI of unclear etiology : Patient will need a 

follow-up MRI of her brain in 1-2 months to really assess if this was just 

motion artifact or if this will need further outpatient workup .





3.  TIA/CVA ruled out : Initially on admission the patient was started on the 

stroke workup as it was unclear of her symptoms may have neurologic CNS origin.

  Her workup was negative for stroke .  Stroke core measures do not apply on 

this patient.





4.  Asymptomatic bradycardia: Patient had heart rates in 50's range when asleep 

or resting. Her heart rate did appropriately increase with ambulation. She is 

not on any rate controlling medications chronically. Recommend follow up with 

primary care provider as out patient. 





   She had no other acute medical issues and on 10/04/2017 she was deemed to be 

subjectively and objectively stable for discharge to home.  We appreciate the 

opportunity to have participated in your patient's care.  If you have any 

questions regarding this patient's hospitalization please feel free to page 

sound physician hospitalist service. Greater than 30 minutes spent of which 

greater than 50% was in  and discharge coordination of care.








Plan - Discharge Summary


New Discharge Prescriptions: 


New


   Promethazine [Phenergan] 12.5 mg PO Q8H #6 tablet





Continue


   Citalopram Hydrobromide [CeleXA] 20 mg PO QAM


   Ibuprofen [Motrin] 800 mg PO Q6H PRN #0 


     PRN Reason: Pain





Discontinued


   Lisinopril-Hctz 20-12.5 mg [Zestoretic 20-12.5] 1 tab PO QAM


Discharge Medication List





Citalopram Hydrobromide [CeleXA] 20 mg PO QAM 12/17/15 [History]


Ibuprofen [Motrin] 800 mg PO Q6H PRN #0  10/04/17 [Rx]


Promethazine [Phenergan] 12.5 mg PO Q8H #6 tablet 10/04/17 [Rx]








Follow up Appointment(s)/Referral(s): 


Frida Crowell MD [Primary Care Provider] - 1-2 days

## 2017-10-09 ENCOUNTER — HOSPITAL ENCOUNTER (OUTPATIENT)
Dept: HOSPITAL 47 - MMGSC | Age: 57
Discharge: HOME | End: 2017-10-09
Payer: COMMERCIAL

## 2017-10-09 DIAGNOSIS — E78.5: ICD-10-CM

## 2017-10-09 DIAGNOSIS — E55.9: ICD-10-CM

## 2017-10-09 DIAGNOSIS — H60.90: ICD-10-CM

## 2017-10-09 DIAGNOSIS — E11.9: ICD-10-CM

## 2017-10-09 DIAGNOSIS — R79.89: ICD-10-CM

## 2017-10-09 DIAGNOSIS — I10: Primary | ICD-10-CM

## 2017-10-09 LAB
ALP SERPL-CCNC: 77 U/L (ref 38–126)
ALT SERPL-CCNC: 45 U/L (ref 9–52)
ANION GAP SERPL CALC-SCNC: 9 MMOL/L
AST SERPL-CCNC: 30 U/L (ref 14–36)
BUN SERPL-SCNC: 17 MG/DL (ref 7–17)
CALCIUM SPEC-MCNC: 9.2 MG/DL (ref 8.4–10.2)
CHLORIDE SERPL-SCNC: 105 MMOL/L (ref 98–107)
CHOLEST SERPL-MCNC: 267 MG/DL (ref ?–200)
CO2 SERPL-SCNC: 26 MMOL/L (ref 22–30)
GLUCOSE SERPL-MCNC: 90 MG/DL (ref 74–99)
HDLC SERPL-MCNC: 55 MG/DL (ref 40–60)
HEMOGLOBIN A1C: 5.9 % (ref 4.2–6.1)
NON-AFRICAN AMERICAN GFR(MDRD): >60
POTASSIUM SERPL-SCNC: 4.6 MMOL/L (ref 3.5–5.1)
PROT SERPL-MCNC: 6.1 G/DL (ref 6.3–8.2)
SODIUM SERPL-SCNC: 140 MMOL/L (ref 137–145)

## 2017-10-09 PROCEDURE — 87070 CULTURE OTHR SPECIMN AEROBIC: CPT

## 2017-10-09 PROCEDURE — 82306 VITAMIN D 25 HYDROXY: CPT

## 2017-10-09 PROCEDURE — 87205 SMEAR GRAM STAIN: CPT

## 2017-10-09 PROCEDURE — 80061 LIPID PANEL: CPT

## 2017-10-09 PROCEDURE — 36415 COLL VENOUS BLD VENIPUNCTURE: CPT

## 2017-10-09 PROCEDURE — 83036 HEMOGLOBIN GLYCOSYLATED A1C: CPT

## 2017-10-09 PROCEDURE — 80053 COMPREHEN METABOLIC PANEL: CPT

## 2018-08-17 ENCOUNTER — HOSPITAL ENCOUNTER (OUTPATIENT)
Dept: HOSPITAL 47 - LABWHC1 | Age: 58
Discharge: HOME | End: 2018-08-17
Attending: FAMILY MEDICINE
Payer: COMMERCIAL

## 2018-08-17 DIAGNOSIS — I10: ICD-10-CM

## 2018-08-17 DIAGNOSIS — E11.9: Primary | ICD-10-CM

## 2018-08-17 LAB
ALBUMIN SERPL-MCNC: 3.7 G/DL (ref 3.5–5)
ALP SERPL-CCNC: 67 U/L (ref 38–126)
ALT SERPL-CCNC: 26 U/L (ref 9–52)
ANION GAP SERPL CALC-SCNC: 6 MMOL/L
AST SERPL-CCNC: 20 U/L (ref 14–36)
BUN SERPL-SCNC: 23 MG/DL (ref 7–17)
CALCIUM SPEC-MCNC: 9.1 MG/DL (ref 8.4–10.2)
CHLORIDE SERPL-SCNC: 108 MMOL/L (ref 98–107)
CHOLEST SERPL-MCNC: 188 MG/DL (ref ?–200)
CO2 SERPL-SCNC: 26 MMOL/L (ref 22–30)
GLUCOSE SERPL-MCNC: 100 MG/DL (ref 74–99)
HBA1C MFR BLD: 6.2 % (ref 4–6)
HDLC SERPL-MCNC: 54 MG/DL (ref 40–60)
LDLC SERPL CALC-MCNC: 117 MG/DL (ref 0–99)
POTASSIUM SERPL-SCNC: 4.4 MMOL/L (ref 3.5–5.1)
PROT SERPL-MCNC: 6.3 G/DL (ref 6.3–8.2)
SODIUM SERPL-SCNC: 140 MMOL/L (ref 137–145)
TRIGL SERPL-MCNC: 83 MG/DL (ref ?–150)

## 2018-08-17 PROCEDURE — 80061 LIPID PANEL: CPT

## 2018-08-17 PROCEDURE — 82043 UR ALBUMIN QUANTITATIVE: CPT

## 2018-08-17 PROCEDURE — 80053 COMPREHEN METABOLIC PANEL: CPT

## 2018-08-17 PROCEDURE — 84443 ASSAY THYROID STIM HORMONE: CPT

## 2018-08-17 PROCEDURE — 36415 COLL VENOUS BLD VENIPUNCTURE: CPT

## 2018-08-17 PROCEDURE — 82570 ASSAY OF URINE CREATININE: CPT

## 2018-08-17 PROCEDURE — 83036 HEMOGLOBIN GLYCOSYLATED A1C: CPT

## 2018-09-14 ENCOUNTER — HOSPITAL ENCOUNTER (OUTPATIENT)
Dept: HOSPITAL 47 - RADMAMWWP | Age: 58
Discharge: HOME | End: 2018-09-14
Attending: FAMILY MEDICINE
Payer: COMMERCIAL

## 2018-09-14 DIAGNOSIS — Z12.31: Primary | ICD-10-CM

## 2018-09-14 DIAGNOSIS — R22.9: ICD-10-CM

## 2018-09-14 PROCEDURE — 77067 SCR MAMMO BI INCL CAD: CPT

## 2018-09-14 PROCEDURE — 77063 BREAST TOMOSYNTHESIS BI: CPT

## 2018-09-14 NOTE — US
EXAMINATION TYPE: US axilla LT

 

DATE OF EXAM: 9/14/2018

 

COMPARISON: NONE

 

CLINICAL HISTORY: R22.9 L AXILLARY MASS.

 

Left axilla scanned with additional imaging over the palpable concern.

 

Few normal appearing nodes seen.  At the site of the BB there are two lymph nodes measuring 2.1 x 0.8
 x 1.0cm and 1.7 x 0.8 x 1.2cm.  Otherwise unremarkable scan. 

 

 

 

IMPRESSION:  

1. Axillary lymph nodes are at the level marked by the BBs. Suspicious-appearing lymphadenopathy is n
ot identified. No suspicious masses are evident.

## 2018-09-17 NOTE — MM
Reason for exam: screening  (asymptomatic).

Last mammogram was performed 1 year and 1 month ago.



History:

Patient is postmenopausal and has history of other cancer at age 54.

Took estrogen for 2 years.



Physical Findings:

A clinical breast exam by your physician is recommended on an annual basis and 

results should be correlated with mammographic findings.



MG 3D Screening Mammo W/Cad

Bilateral CC and MLO view(s) were taken.

Prior study comparison: August 14, 2017, bilateral MG 3d screening mammo w/cad.  

December 28, 2012, mammogram, performed at [facility].

There are scattered fibroglandular densities.  There is no discrete abnormality.  

No significant changes when compared with prior studies.





ASSESSMENT: Negative, BI-RAD 1



RECOMMENDATION:

Routine screening mammogram of both breasts in 1 year.

## 2019-11-08 ENCOUNTER — HOSPITAL ENCOUNTER (OUTPATIENT)
Dept: HOSPITAL 47 - ORWHC2ENDO | Age: 59
Discharge: HOME | End: 2019-11-08
Attending: SURGERY
Payer: COMMERCIAL

## 2019-11-08 VITALS — TEMPERATURE: 97.2 F

## 2019-11-08 VITALS — RESPIRATION RATE: 20 BRPM | SYSTOLIC BLOOD PRESSURE: 161 MMHG | HEART RATE: 56 BPM | DIASTOLIC BLOOD PRESSURE: 88 MMHG

## 2019-11-08 VITALS — BODY MASS INDEX: 36.5 KG/M2

## 2019-11-08 DIAGNOSIS — Z85.828: ICD-10-CM

## 2019-11-08 DIAGNOSIS — E11.9: ICD-10-CM

## 2019-11-08 DIAGNOSIS — M19.90: ICD-10-CM

## 2019-11-08 DIAGNOSIS — G43.909: ICD-10-CM

## 2019-11-08 DIAGNOSIS — Z86.79: ICD-10-CM

## 2019-11-08 DIAGNOSIS — K21.9: ICD-10-CM

## 2019-11-08 DIAGNOSIS — E78.5: ICD-10-CM

## 2019-11-08 DIAGNOSIS — Z98.811: ICD-10-CM

## 2019-11-08 DIAGNOSIS — Z98.890: ICD-10-CM

## 2019-11-08 DIAGNOSIS — Z82.49: ICD-10-CM

## 2019-11-08 DIAGNOSIS — Z90.710: ICD-10-CM

## 2019-11-08 DIAGNOSIS — Z84.89: ICD-10-CM

## 2019-11-08 DIAGNOSIS — M19.041: ICD-10-CM

## 2019-11-08 DIAGNOSIS — Z88.5: ICD-10-CM

## 2019-11-08 DIAGNOSIS — Z86.73: ICD-10-CM

## 2019-11-08 DIAGNOSIS — Z84.1: ICD-10-CM

## 2019-11-08 DIAGNOSIS — E66.01: ICD-10-CM

## 2019-11-08 DIAGNOSIS — Z90.89: ICD-10-CM

## 2019-11-08 DIAGNOSIS — Z80.9: ICD-10-CM

## 2019-11-08 DIAGNOSIS — Z87.891: ICD-10-CM

## 2019-11-08 DIAGNOSIS — Z90.49: ICD-10-CM

## 2019-11-08 DIAGNOSIS — M17.0: ICD-10-CM

## 2019-11-08 DIAGNOSIS — M19.042: ICD-10-CM

## 2019-11-08 DIAGNOSIS — K29.50: Primary | ICD-10-CM

## 2019-11-08 DIAGNOSIS — Z82.61: ICD-10-CM

## 2019-11-08 DIAGNOSIS — I10: ICD-10-CM

## 2019-11-08 DIAGNOSIS — Z79.899: ICD-10-CM

## 2019-11-08 LAB — GLUCOSE BLD-MCNC: 113 MG/DL (ref 75–99)

## 2019-11-08 PROCEDURE — 43239 EGD BIOPSY SINGLE/MULTIPLE: CPT

## 2019-11-08 PROCEDURE — 88305 TISSUE EXAM BY PATHOLOGIST: CPT

## 2019-11-08 NOTE — P.OP
Date of Procedure: 11/08/19


Preoperative Diagnosis: 


Morbid obesity, BMI 37








Postoperative Diagnosis: 


Morbid obesity, BMI 37





Mild antral gastritis














Procedure(s) Performed: 


EGD


Anesthesia: MAC


Surgeon: Marquise Suarez


Pathology: other (Antrum)


Condition: stable


Disposition: PACU


Description of Procedure: 


Patient's placed on the endoscopy table in the lateral position.  She received 

IV sedation.  The gastroscope placed oropharynx passed in the esophagus and into

the stomach.  Scope then placed through the pylorus.  The first and second 

portion of duodenum appeared normal.  Scope was then brought back the antrum 

this was minimal inflamed.  A biopsies performed.  There is no evidence of any 

ulceration..  Scope was then retroflexed the remainder of the stomach appeared 

normal.  The patient a previous fundoplication wrap.  The GE junction was at 39 

cm.  The distal esophagus appeared normal.  The proximal esophagus appeared 

normal.  Scope was withdrawn for patient.

## 2019-11-08 NOTE — P.GSHP
History of Present Illness


H&P Date: 19


Chief Complaint: GERD, morbid obesity





This is a 58-year-old female who presents today for EGD.  Patient history of 

GERD.  She has morbid obesity.  Her BMI is 37.





Past Medical History


Past Medical History: Cancer, Chest Pain / Angina, CVA/TIA, Diabetes Mellitus, 

GERD/Reflux, Hyperlipidemia, Hypertension, Osteoarthritis (OA)


Additional Past Medical History / Comment(s): MIGRAINES , ? TIA, , DIET 

CONTROLLED DIABETIC, SKIN CANCER. , BACK PAIN , ARTHRITIS KNEES & HANDS.


History of Any Multi-Drug Resistant Organisms: None Reported


Past Surgical History: Cholecystectomy, Heart Catheterization, Hysterectomy, 

Orthopedic Surgery, Tonsillectomy


Additional Past Surgical History / Comment(s): rt  rotator cuff, left knee 

surgery x 7-some arthroscopic, 1/15/16 lap nissen fundoplasty, LT WRIST GANGLION

CYST, LT CARPAL TUNNEL, COLONOSCOPY/EGD


Past Anesthesia/Blood Transfusion Reactions: Family History of Problems w/ 

Anesthesia, Motion Sickness


Additional Past Anesthesia/Blood Transfusion Reaction / Comment(s): SISTERS PONV


Smoking Status: Former smoker





- Past Family History


  ** Mother


Family Medical History: Deep Vein Thrombosis (DVT), Hypertension, Osteoarthritis

(OA)





  ** Father


Additional Family Medical History / Comment(s): dad  [place] . only hx

known is he was born with only one kidney.





  ** Brother(s)


Family Medical History: Cancer


Additional Family Medical History / Comment(s): BASAL CELL CA





  ** Sister(s)


Family Medical History: Cancer


Additional Family Medical History / Comment(s): BASAL CELL CA





Medications and Allergies


                                Home Medications











 Medication  Instructions  Recorded  Confirmed  Type


 


Citalopram Hydrobromide [CeleXA] 20 mg PO QAM 12/17/15 12/04/17 History


 


Acetaminophen [Tylenol Extra 1,000 mg PO Q8HR PRN 17 History





Strength]    


 


Lisinopril-Hctz 20-12.5 mg 2 tab PO DAILY 17 History





[Zestoretic 20-12.5]    








                                    Allergies











Allergy/AdvReac Type Severity Reaction Status Date / Time


 


codeine Allergy  Nausea & Verified 19 10:15





   Vomiting  


 


hydrocodone bitartrate Allergy  Nausea & Verified 19 10:15





[From Vicodin]   Vomiting  


 


hydromorphone HCl Allergy  Nausea & Verified 19 10:15





[From Dilaudid]   Vomiting  


 


meperidine HCl [From Demerol] Allergy  Nausea & Verified 19 10:15





   Vomiting  


 


morphine Allergy  Nausea & Verified 19 10:15





   Vomiting  














Surgical - Exam


                                   Vital Signs











Temp Pulse Resp BP Pulse Ox


 


 97.2 F L  66   16   174/105   99 


 


 19 08:28  19 08:28  19 08:28  19 08:28  19 08:28














- General


well developed, well nourished, no distress





- Eyes


PERRL





- ENT


normal pinna





- Neck


no masses





- Respiratory


normal expansion





- Cardiovascular


Rhythm: regular





- Abdomen


Abdomen: soft, non tender





Results





- Labs


                  Abnormal Lab Results - Last 24 Hours (Table)











  19 Range/Units





  08:47 


 


POC Glucose (mg/dL)  113 H  (75-99)  mg/dL














Assessment and Plan


Assessment: 





GERD, morbid obesity.  We'll perform EGD

## 2020-10-27 ENCOUNTER — HOSPITAL ENCOUNTER (OUTPATIENT)
Dept: HOSPITAL 47 - LABWHC1 | Age: 60
Discharge: HOME | End: 2020-10-27
Attending: FAMILY MEDICINE
Payer: COMMERCIAL

## 2020-10-27 DIAGNOSIS — Z03.818: Primary | ICD-10-CM

## 2021-10-12 NOTE — EM
EVENT MONITOR



SEVEN-DAY EVENT MONITOR:

Patient had several transmissions.  Most of these were auto capture transmissions.  The

patient had one episode of palpitations or skipped beats and isolated PVC was noted.

There was evidence of isolated ventricular ectopic beats and sinus tachycardia.  No

other significant arrhythmia was noted.  It appears that when patient felt a skipped

beat she did have isolated PVCs in a bigeminal fashion.  On other occasions when she

had PVCs, she did not report any symptoms.



FINAL IMPRESSION:

Sinus rhythm with sinus tachycardia and isolated PVCs.  Only one episode of correlation

with patient's perception of palpitations and isolated PVC.





MMODL / IJN: [id number] / Job#: [id number]